# Patient Record
Sex: FEMALE | URBAN - METROPOLITAN AREA
[De-identification: names, ages, dates, MRNs, and addresses within clinical notes are randomized per-mention and may not be internally consistent; named-entity substitution may affect disease eponyms.]

---

## 2021-04-20 ENCOUNTER — APPOINTMENT (RX ONLY)
Dept: URBAN - METROPOLITAN AREA CLINIC 45 | Facility: CLINIC | Age: 36
Setting detail: DERMATOLOGY
End: 2021-04-20

## 2021-04-20 VITALS — TEMPERATURE: 97.8 F

## 2021-04-20 DIAGNOSIS — L60.3 NAIL DYSTROPHY: ICD-10-CM | Status: INADEQUATELY CONTROLLED

## 2021-04-20 DIAGNOSIS — L21.8 OTHER SEBORRHEIC DERMATITIS: ICD-10-CM | Status: INADEQUATELY CONTROLLED

## 2021-04-20 PROCEDURE — 99203 OFFICE O/P NEW LOW 30 MIN: CPT

## 2021-04-20 PROCEDURE — ? COUNSELING

## 2021-04-20 PROCEDURE — ? PRESCRIPTION

## 2021-04-20 PROCEDURE — ? TREATMENT REGIMEN

## 2021-04-20 RX ORDER — KETOCONAZOLE 20 MG/G
APPLY CREAM TOPICAL QD
Qty: 1 | Refills: 2 | Status: ERX | COMMUNITY
Start: 2021-04-20

## 2021-04-20 RX ORDER — POLY-UREAURETHANE 16 %
APPLY NAIL FILM SOLUTION (ML) TOPICAL QHS
Qty: 1 | Refills: 5 | Status: ERX | COMMUNITY
Start: 2021-04-20

## 2021-04-20 RX ADMIN — Medication APPLY: at 00:00

## 2021-04-20 RX ADMIN — KETOCONAZOLE APPLY: 20 CREAM TOPICAL at 00:00

## 2021-04-20 ASSESSMENT — LOCATION ZONE DERM
LOCATION ZONE: LIP
LOCATION ZONE: NOSE
LOCATION ZONE: FINGERNAIL
LOCATION ZONE: FACE

## 2021-04-20 ASSESSMENT — LOCATION DETAILED DESCRIPTION DERM
LOCATION DETAILED: GLABELLA
LOCATION DETAILED: RIGHT ORAL COMMISSURE
LOCATION DETAILED: RIGHT NASAL ALAR GROOVE
LOCATION DETAILED: LEFT THUMBNAIL
LOCATION DETAILED: LEFT ORAL COMMISSURE
LOCATION DETAILED: LEFT NASAL ALAR GROOVE

## 2021-04-20 ASSESSMENT — LOCATION SIMPLE DESCRIPTION DERM
LOCATION SIMPLE: LEFT THUMBNAIL
LOCATION SIMPLE: GLABELLA
LOCATION SIMPLE: LEFT LIP
LOCATION SIMPLE: RIGHT NOSE
LOCATION SIMPLE: RIGHT LIP
LOCATION SIMPLE: LEFT NOSE

## 2021-04-20 ASSESSMENT — SEVERITY ASSESSMENT: HOW SEVERE IS THIS PATIENT'S CONDITION?: MILD

## 2021-04-20 NOTE — PROCEDURE: TREATMENT REGIMEN
Initiate Treatment: Nuvail to apply to thumb nail daily as directed
Detail Level: Detailed
Initiate Treatment: Ketoconazole cream to apply to affected areas bid x 1-2 weeks then 2-3 times per week for maintenance

## 2021-04-20 NOTE — HPI: NAIL DYSTROPHY
How Severe Is It?: moderate
Is This A New Presentation, Or A Follow-Up?: Nail Dystrophy
Additional History: She states that he nail has been splitting for over 2 years, she cannot get it to grow out normally.

## 2021-07-20 ENCOUNTER — APPOINTMENT (RX ONLY)
Dept: URBAN - METROPOLITAN AREA CLINIC 45 | Facility: CLINIC | Age: 36
Setting detail: DERMATOLOGY
End: 2021-07-20

## 2021-07-20 VITALS — TEMPERATURE: 98 F

## 2021-07-20 DIAGNOSIS — I78.8 OTHER DISEASES OF CAPILLARIES: ICD-10-CM | Status: INADEQUATELY CONTROLLED

## 2021-07-20 DIAGNOSIS — L60.3 NAIL DYSTROPHY: ICD-10-CM | Status: INADEQUATELY CONTROLLED

## 2021-07-20 DIAGNOSIS — L21.8 OTHER SEBORRHEIC DERMATITIS: ICD-10-CM | Status: WELL CONTROLLED

## 2021-07-20 PROCEDURE — ? TREATMENT REGIMEN

## 2021-07-20 PROCEDURE — ? COUNSELING

## 2021-07-20 PROCEDURE — 99213 OFFICE O/P EST LOW 20 MIN: CPT

## 2021-07-20 ASSESSMENT — LOCATION SIMPLE DESCRIPTION DERM
LOCATION SIMPLE: GLABELLA
LOCATION SIMPLE: RIGHT LIP
LOCATION SIMPLE: LEFT LIP
LOCATION SIMPLE: NOSE
LOCATION SIMPLE: LEFT THUMBNAIL
LOCATION SIMPLE: RIGHT NOSE
LOCATION SIMPLE: LEFT NOSE

## 2021-07-20 ASSESSMENT — LOCATION DETAILED DESCRIPTION DERM
LOCATION DETAILED: NASAL TIP
LOCATION DETAILED: GLABELLA
LOCATION DETAILED: LEFT THUMBNAIL
LOCATION DETAILED: RIGHT NASAL ALAR GROOVE
LOCATION DETAILED: RIGHT ORAL COMMISSURE
LOCATION DETAILED: LEFT ORAL COMMISSURE
LOCATION DETAILED: LEFT NASAL ALAR GROOVE

## 2021-07-20 ASSESSMENT — LOCATION ZONE DERM
LOCATION ZONE: FINGERNAIL
LOCATION ZONE: NOSE
LOCATION ZONE: LIP
LOCATION ZONE: FACE

## 2021-07-20 ASSESSMENT — SEVERITY ASSESSMENT: HOW SEVERE IS THIS PATIENT'S CONDITION?: ALMOST CLEAR

## 2021-07-20 NOTE — PROCEDURE: COUNSELING
Detail Level: Detailed
Biotin Recommendations: Recommended biotin supplement 5,000mcg daily
Detail Level: Simple

## 2021-07-20 NOTE — PROCEDURE: TREATMENT REGIMEN
Continue Regimen: Ketoconazole cream to apply to affected areas bid x 1-2 weeks for severe flares then 2-3 times per week for maintenance
Detail Level: Detailed
Plan: Avoid trauma to the nail and advised her to keep superglue on it to keep it from catching on things
Otc Regimen: Continue Dermanail as directed
Plan: Recommended consult with Sol Hamilton  for IPL

## 2021-08-12 ENCOUNTER — APPOINTMENT (RX ONLY)
Dept: URBAN - METROPOLITAN AREA CLINIC 45 | Facility: CLINIC | Age: 36
Setting detail: DERMATOLOGY
End: 2021-08-12

## 2021-08-12 DIAGNOSIS — Z41.9 ENCOUNTER FOR PROCEDURE FOR PURPOSES OTHER THAN REMEDYING HEALTH STATE, UNSPECIFIED: ICD-10-CM

## 2021-08-12 PROCEDURE — ? LUMENIS M22

## 2021-08-12 ASSESSMENT — LOCATION SIMPLE DESCRIPTION DERM: LOCATION SIMPLE: NOSE

## 2021-08-12 ASSESSMENT — LOCATION ZONE DERM: LOCATION ZONE: NOSE

## 2021-08-12 ASSESSMENT — LOCATION DETAILED DESCRIPTION DERM: LOCATION DETAILED: NASAL SUPRATIP

## 2021-08-12 NOTE — PROCEDURE: LUMENIS M22
Pulse Delay (In Milliseconds): 30
Price (Use Numbers Only, No Special Characters Or $): 50.00
Fluence: 19
Treatment Number: 1
Pulse Duration (In Milliseconds): 4
Consent: Written consent obtained, risks reviewed including but not limited to crusting, scabbing, blistering, scarring, darker or lighter pigmentary change, bruising, and/or incomplete response.
Number Of Passes: 2
External Cooling Fan Speed: 0
Procedure Text: The patient's skin was cleaned and the procedure was performed using the parameters noted above.
Skin Type (Optional): III
Fluence Units: J/cm2
Post-Care Instructions: I reviewed with the patient in detail post-care instructions. Patient should stay away from the sun and use.
Detail Level: Simple
Total Pulses: 21
Treated Area: small area
Depth: medium

## 2021-09-09 ENCOUNTER — APPOINTMENT (RX ONLY)
Dept: URBAN - METROPOLITAN AREA CLINIC 45 | Facility: CLINIC | Age: 36
Setting detail: DERMATOLOGY
End: 2021-09-09

## 2021-09-09 DIAGNOSIS — Z41.9 ENCOUNTER FOR PROCEDURE FOR PURPOSES OTHER THAN REMEDYING HEALTH STATE, UNSPECIFIED: ICD-10-CM

## 2021-09-09 PROCEDURE — ? LUMENIS M22

## 2021-09-09 ASSESSMENT — LOCATION SIMPLE DESCRIPTION DERM
LOCATION SIMPLE: NOSE
LOCATION SIMPLE: LEFT CHEEK
LOCATION SIMPLE: RIGHT CHEEK

## 2021-09-09 ASSESSMENT — LOCATION ZONE DERM
LOCATION ZONE: NOSE
LOCATION ZONE: FACE

## 2021-09-09 ASSESSMENT — LOCATION DETAILED DESCRIPTION DERM
LOCATION DETAILED: LEFT INFERIOR MEDIAL MALAR CHEEK
LOCATION DETAILED: NASAL SUPRATIP
LOCATION DETAILED: RIGHT INFERIOR MEDIAL MALAR CHEEK

## 2021-09-09 NOTE — PROCEDURE: LUMENIS M22
Detail Level: Simple
Total Pulses: 32
Procedure Text: The patient's skin was cleaned and the procedure was performed using the parameters noted above.
Depth: medium
Post-Care Instructions: I reviewed with the patient in detail post-care instructions. Patient should stay away from the sun and use.
Treatment Number: 2
Pulse Duration (In Milliseconds): 4
Price (Use Numbers Only, No Special Characters Or $): 350.00
Fluence: 18
Pulse Delay (In Milliseconds): 30
Skin Type (Optional): III
Consent: Written consent obtained, risks reviewed including but not limited to crusting, scabbing, blistering, scarring, darker or lighter pigmentary change, bruising, and/or incomplete response.
External Cooling Fan Speed: 0
Fluence Units: J/cm2
Treated Area: large area